# Patient Record
Sex: MALE | Race: WHITE | NOT HISPANIC OR LATINO | Employment: OTHER | URBAN - METROPOLITAN AREA
[De-identification: names, ages, dates, MRNs, and addresses within clinical notes are randomized per-mention and may not be internally consistent; named-entity substitution may affect disease eponyms.]

---

## 2018-01-09 ENCOUNTER — OFFICE VISIT (OUTPATIENT)
Dept: ORTHOPEDIC SURGERY | Facility: CLINIC | Age: 73
End: 2018-01-09

## 2018-01-09 VITALS
WEIGHT: 207.23 LBS | BODY MASS INDEX: 29.67 KG/M2 | DIASTOLIC BLOOD PRESSURE: 92 MMHG | HEIGHT: 70 IN | HEART RATE: 104 BPM | SYSTOLIC BLOOD PRESSURE: 148 MMHG

## 2018-01-09 DIAGNOSIS — Z96.659 HISTORY OF PARTIAL KNEE REPLACEMENT: Primary | ICD-10-CM

## 2018-01-09 PROCEDURE — 99213 OFFICE O/P EST LOW 20 MIN: CPT | Performed by: PHYSICIAN ASSISTANT

## 2018-01-09 RX ORDER — LANOLIN ALCOHOL/MO/W.PET/CERES
1000 CREAM (GRAM) TOPICAL DAILY
COMMUNITY

## 2018-01-09 RX ORDER — DULOXETIN HYDROCHLORIDE 60 MG/1
CAPSULE, DELAYED RELEASE ORAL
COMMUNITY
Start: 2017-10-19

## 2018-01-09 RX ORDER — HYDROCODONE BITARTRATE AND ACETAMINOPHEN 7.5; 325 MG/1; MG/1
TABLET ORAL
COMMUNITY
Start: 2017-11-16

## 2018-01-09 RX ORDER — PREDNISONE 1 MG/1
TABLET ORAL
COMMUNITY
Start: 2017-11-16

## 2018-01-09 RX ORDER — MELATONIN
1000 DAILY
COMMUNITY

## 2018-01-09 RX ORDER — MYCOPHENOLATE MOFETIL 500 MG/1
TABLET ORAL
COMMUNITY
Start: 2017-12-19

## 2018-01-09 RX ORDER — ROPINIROLE 0.5 MG/1
0.5 TABLET, FILM COATED ORAL NIGHTLY
COMMUNITY

## 2018-01-09 RX ORDER — ASPIRIN 81 MG/1
81 TABLET ORAL DAILY
COMMUNITY

## 2018-01-09 RX ORDER — CLONAZEPAM 0.5 MG/1
TABLET ORAL
COMMUNITY
Start: 2017-11-20

## 2018-01-09 RX ORDER — OMEPRAZOLE 20 MG/1
CAPSULE, DELAYED RELEASE ORAL
COMMUNITY
Start: 2017-11-06

## 2018-01-09 RX ORDER — BACLOFEN 10 MG/1
TABLET ORAL
COMMUNITY
Start: 2017-12-21

## 2018-01-09 NOTE — PROGRESS NOTES
[facility] Note    Subjective     Patient: Surjit Recio  : 1945    Primary Care Provider: Jaime Raymond MD    Requesting Provider: As above    Follow-up (1 year (Rt partital medial knee arthroplasty 11/10/15, Lt Partial medial knee arthroplasty 13))      History    Chief Complaint: routine f/up B partial medial knee arthroplasty    History of Present Illness: This is a very pleasant 9-year-old male presenting today for routine follow-up of right partial medial knee arthroplasty 11/15 and left partial medial knee arthroplasty  with Dr. jeremías Thomas.  He reports occasional mild medial and anterior knee pain with walking long distances and with stairs.  He denies any radiating pain or mechanical symptoms he reports the pain resolves as soon as he quits his activity.  It is not enough pain to consider any further treatment.    No current outpatient prescriptions on file prior to visit.     No current facility-administered medications on file prior to visit.       Allergies   Allergen Reactions   • Statins       Past Medical History:   Diagnosis Date   • Diabetes      Past Surgical History:   Procedure Laterality Date   • BACK SURGERY     • KNEE SURGERY       Family History   Problem Relation Age of Onset   • Hypertension Mother    • Heart attack Mother    • Hypertension Father    • Heart attack Father       Social History     Social History   • Marital status:      Spouse name: N/A   • Number of children: N/A   • Years of education: N/A     Occupational History   • Not on file.     Social History Main Topics   • Smoking status: Never Smoker   • Smokeless tobacco: Never Used   • Alcohol use No   • Drug use: No   • Sexual activity: Defer     Other Topics Concern   • Not on file     Social History Narrative   • No narrative on file        Review of Systems   HENT: Positive for sneezing.    Eyes: Positive for itching.   Respiratory: Negative.   "  Cardiovascular: Negative.    Gastrointestinal: Positive for constipation.   Endocrine: Negative.    Genitourinary: Negative.    Musculoskeletal: Positive for back pain, gait problem, joint swelling, neck pain and neck stiffness.        H/o bilateral knee surgery   Skin: Negative.    Allergic/Immunologic: Negative.    Neurological: Positive for tremors, weakness and light-headedness.   Hematological: Negative.    Psychiatric/Behavioral: Negative.        The following portions of the patient's history were reviewed and updated as appropriate: allergies, current medications, past family history, past medical history, past social history, past surgical history and problem list.      Objective      Physical Exam  /92  Pulse 104  Ht 176.5 cm (69.5\")  Wt 94 kg (207 lb 3.7 oz)  BMI 30.16 kg/m2    Body mass index is 30.16 kg/(m^2).    GENERAL: Body habitus: obese    Lower extremity edema: Left: trace; Right: trace    Varicose veins:  Left: mild; Right: mild    Gait: antalgic     Mental Status:  awake and alert; oriented to person, place, and time    Voice:  clear  SKIN:  Normal    Hair Growth:  Right:normal; Left:  normal  HEENT: Head: Normocephalic, atraumatic,  without obvious abnormality.  eye: normal external eye, no icterus   PULM:  Repiratory effort normal    Ortho Exam    Right Knee Exam  ----------  ALIGNMENT: Right: neutral----------  RANGE OF MOTION:  Right: 0-120  LIGAMENTOUS STABILITY:   Right:stable to varus and valgus stress at terminal extension and 30 degrees without any evidence of laxity----------  STRENGTH:  KNEE FLEXION Right 5/5  KNEE EXTENSION Right 5/5 ----------  PAIN WITH PALPATION: Right none  PAIN WITH KNEE ROM: Right no  PATELLAR CREPITUS: Right yes asymptomatic  ----------  Well healed anterior incision    Left Knee Exam  ----------  Knee Exam:  ----------  ALIGNMENT:  Left: neutral  ----------  RANGE OF MOTION:  Left: 0-120  LIGAMENTOUS STABILITY:   Left:stable to varus and valgus " stress at terminal extension and 30 degrees without any evidence of laxity   ----------  STRENGTH:  KNEE FLEXION  Left 5/5  KNEE EXTENSION Left 5/5  ----------  PAIN WITH PALPATION: Left denies tenderness to palpation about the knee  PAIN WITH KNEE ROM:  Left no  PATELLAR CREPITUS:  Left no  ----------  Well healed anterior incision      Medical Decision Making    Data Review:   ordered and reviewed x-rays today    Assessment:  1. History of partial knee replacement        Plan:  Doing well status post bilateral partial medial knee arthroplasty with Dr. jeremías Thomas.  I reviewed clinical findings and x-rays with the patient today.  On exam, he has good range of motion with stable ligamentous exam and he is nontender.  X-rays today show well-positioned, cemented partial medial knee arthroplasties with no evidence of osteolysis, subsidence or fracture.  Patient reports some mild medial and anterior knee pain with walking long distances and stairs.  I explained to him that the anterior knee pain may be coming from the patellofemoral joint and it is stressed with going up and down stairs.  Recommendation is continued observation.  He'll return in 2 years with repeat x-rays of bilateral knees or sooner if needed.      Valeri Zuniga PA-C  01/09/18  11:20 AM

## 2018-01-09 NOTE — PROGRESS NOTES
I have reviewed the notes, assessments, and/or procedures performed by Valeri Zuniga PA-C, I concur with her documentation of Surjit Recio.

## 2018-03-26 ENCOUNTER — TRANSCRIBE ORDERS (OUTPATIENT)
Dept: MRI IMAGING | Facility: HOSPITAL | Age: 73
End: 2018-03-26

## 2018-03-26 DIAGNOSIS — R41.3 SHORT-TERM MEMORY LOSS: Primary | ICD-10-CM

## 2018-03-29 ENCOUNTER — HOSPITAL ENCOUNTER (OUTPATIENT)
Dept: MRI IMAGING | Facility: HOSPITAL | Age: 73
Discharge: HOME | End: 2018-03-29
Admitting: INTERNAL MEDICINE

## 2018-03-29 DIAGNOSIS — R41.3 SHORT-TERM MEMORY LOSS: ICD-10-CM

## 2018-03-29 PROCEDURE — 70551 MRI BRAIN STEM W/O DYE: CPT
